# Patient Record
Sex: MALE | Race: WHITE | Employment: FULL TIME | URBAN - METROPOLITAN AREA
[De-identification: names, ages, dates, MRNs, and addresses within clinical notes are randomized per-mention and may not be internally consistent; named-entity substitution may affect disease eponyms.]

---

## 2017-03-28 ENCOUNTER — HOSPITAL ENCOUNTER (OUTPATIENT)
Dept: CARDIOLOGY | Age: 57
Discharge: HOME | End: 2017-03-28
Payer: COMMERCIAL

## 2017-03-28 PROCEDURE — 93299 HC INTERROG REMOTE LOOP RECORDER: CPT

## 2017-06-28 ENCOUNTER — HOSPITAL ENCOUNTER (OUTPATIENT)
Dept: CARDIOLOGY | Age: 57
Discharge: HOME | End: 2017-06-28
Payer: COMMERCIAL

## 2017-06-28 PROCEDURE — 93299 HC INTERROG REMOTE LOOP RECORDER: CPT

## 2017-09-29 ENCOUNTER — HOSPITAL ENCOUNTER (OUTPATIENT)
Dept: CARDIOLOGY | Age: 57
Discharge: HOME | End: 2017-09-29
Payer: COMMERCIAL

## 2017-09-29 PROCEDURE — 93299 HC INTERROG REMOTE LOOP RECORDER: CPT

## 2018-01-10 ENCOUNTER — HOSPITAL ENCOUNTER (OUTPATIENT)
Dept: CARDIOLOGY | Age: 58
Discharge: HOME | End: 2018-01-10
Payer: COMMERCIAL

## 2018-01-10 PROCEDURE — 93299 HC INTERROG REMOTE LOOP RECORDER: CPT

## 2018-04-12 ENCOUNTER — HOSPITAL ENCOUNTER (OUTPATIENT)
Dept: CARDIOLOGY | Age: 58
Discharge: HOME | End: 2018-04-12
Payer: COMMERCIAL

## 2018-04-12 PROCEDURE — 93299 HC INTERROG REMOTE LOOP RECORDER: CPT

## 2018-07-12 ENCOUNTER — HOSPITAL ENCOUNTER (OUTPATIENT)
Dept: CARDIOLOGY | Age: 58
Discharge: HOME | End: 2018-07-12
Payer: COMMERCIAL

## 2018-07-12 PROCEDURE — 93299 HC INTERROG REMOTE LOOP RECORDER: CPT

## 2018-10-15 ENCOUNTER — HOSPITAL ENCOUNTER (OUTPATIENT)
Dept: CARDIOLOGY | Age: 58
Discharge: HOME | End: 2018-10-15
Payer: COMMERCIAL

## 2018-10-15 PROCEDURE — 93299 HC INTERROG REMOTE LOOP RECORDER: CPT

## 2019-01-14 ENCOUNTER — HOSPITAL ENCOUNTER (OUTPATIENT)
Dept: CARDIOLOGY | Age: 59
Discharge: HOME | End: 2019-01-14
Payer: COMMERCIAL

## 2019-01-14 PROCEDURE — 93299 HC INTERROG REMOTE LOOP RECORDER: CPT

## 2019-04-12 ENCOUNTER — HOSPITAL ENCOUNTER (OUTPATIENT)
Dept: CARDIOLOGY | Age: 59
Discharge: HOME | End: 2019-04-12
Payer: COMMERCIAL

## 2019-04-12 PROCEDURE — 93299 HC INTERROG REMOTE LOOP RECORDER: CPT

## 2019-07-15 ENCOUNTER — HOSPITAL ENCOUNTER (OUTPATIENT)
Dept: CARDIOLOGY | Age: 59
Discharge: HOME | End: 2019-07-15
Payer: COMMERCIAL

## 2019-07-15 PROCEDURE — 93296 REM INTERROG EVL PM/IDS: CPT

## 2019-10-10 ENCOUNTER — HOSPITAL ENCOUNTER (OUTPATIENT)
Dept: CARDIOLOGY | Age: 59
Discharge: HOME | End: 2019-10-10
Payer: COMMERCIAL

## 2019-10-10 PROCEDURE — 93299 HC INTERROG REMOTE LOOP RECORDER: CPT

## 2020-01-10 ENCOUNTER — HOSPITAL ENCOUNTER (OUTPATIENT)
Dept: CARDIOLOGY | Age: 60
Discharge: HOME | End: 2020-01-10
Payer: COMMERCIAL

## 2020-01-10 PROCEDURE — G2066 INTER DEVC REMOTE 30D: HCPCS

## 2020-04-07 ENCOUNTER — HOSPITAL ENCOUNTER (OUTPATIENT)
Dept: CARDIOLOGY | Age: 60
Discharge: HOME | End: 2020-04-07
Payer: COMMERCIAL

## 2020-04-07 PROCEDURE — G2066 INTER DEVC REMOTE 30D: HCPCS

## 2020-07-09 ENCOUNTER — HOSPITAL ENCOUNTER (OUTPATIENT)
Dept: CARDIOLOGY | Age: 60
Discharge: HOME | End: 2020-07-09
Payer: COMMERCIAL

## 2020-07-09 PROCEDURE — G2066 INTER DEVC REMOTE 30D: HCPCS

## 2021-11-20 ENCOUNTER — APPOINTMENT (OUTPATIENT)
Dept: GENERAL RADIOLOGY | Age: 61
End: 2021-11-20
Payer: COMMERCIAL

## 2021-11-20 ENCOUNTER — APPOINTMENT (OUTPATIENT)
Dept: CT IMAGING | Age: 61
End: 2021-11-20
Payer: COMMERCIAL

## 2021-11-20 ENCOUNTER — HOSPITAL ENCOUNTER (EMERGENCY)
Age: 61
Discharge: HOME | End: 2021-11-20
Attending: EMERGENCY MEDICINE
Payer: COMMERCIAL

## 2021-11-20 VITALS
SYSTOLIC BLOOD PRESSURE: 162 MMHG | TEMPERATURE: 98.3 F | HEART RATE: 84 BPM | BODY MASS INDEX: 23.62 KG/M2 | RESPIRATION RATE: 20 BRPM | OXYGEN SATURATION: 97 % | HEIGHT: 70 IN | DIASTOLIC BLOOD PRESSURE: 85 MMHG | WEIGHT: 165 LBS
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
## 2021-11-20 DIAGNOSIS — S09.90XA CLOSED HEAD INJURY, INITIAL ENCOUNTER: Primary | ICD-10-CM

## 2021-11-20 PROCEDURE — 70450 CT HEAD/BRAIN W/O DYE: CPT

## 2021-11-20 PROCEDURE — 73502 X-RAY EXAM HIP UNI 2-3 VIEWS: CPT

## 2021-11-20 PROCEDURE — 99285 EMERGENCY DEPT VISIT HI MDM: CPT

## 2021-11-20 PROCEDURE — 6370000000 HC RX 637 (ALT 250 FOR IP): Performed by: EMERGENCY MEDICINE

## 2021-11-20 RX ORDER — OXYCODONE HYDROCHLORIDE AND ACETAMINOPHEN 5; 325 MG/1; MG/1
1 TABLET ORAL ONCE
Status: COMPLETED | OUTPATIENT
Start: 2021-11-20 | End: 2021-11-20

## 2021-11-20 RX ADMIN — OXYCODONE AND ACETAMINOPHEN 1 TABLET: 5; 325 TABLET ORAL at 19:42

## 2021-11-20 ASSESSMENT — PAIN DESCRIPTION - PAIN TYPE: TYPE: ACUTE PAIN

## 2021-11-20 ASSESSMENT — PAIN DESCRIPTION - ORIENTATION: ORIENTATION: RIGHT

## 2021-11-20 ASSESSMENT — PAIN DESCRIPTION - LOCATION: LOCATION: SHOULDER;HIP

## 2021-11-20 ASSESSMENT — PAIN SCALES - GENERAL
PAINLEVEL_OUTOF10: 10
PAINLEVEL_OUTOF10: 10

## 2021-11-21 NOTE — ED TRIAGE NOTES
Patient presents to the er with complaints of fall from 10-12 foot ladder  States that he lost balance and fell on his back   Pain in his right shoulder and right hip  Can not bare weight on his right hip   Denies LOC   States he had trouble breathing after falling  Denies blood thinners   Vital signs stable  Resp even and unlabored

## 2021-11-21 NOTE — ED PROVIDER NOTES
Used   Substance and Sexual Activity    Alcohol use: Not Currently    Drug use: Never    Sexual activity: None   Other Topics Concern    None   Social History Narrative    None     Social Determinants of Health     Financial Resource Strain:     Difficulty of Paying Living Expenses: Not on file   Food Insecurity:     Worried About Running Out of Food in the Last Year: Not on file    Radha of Food in the Last Year: Not on file   Transportation Needs:     Lack of Transportation (Medical): Not on file    Lack of Transportation (Non-Medical):  Not on file   Physical Activity:     Days of Exercise per Week: Not on file    Minutes of Exercise per Session: Not on file   Stress:     Feeling of Stress : Not on file   Social Connections:     Frequency of Communication with Friends and Family: Not on file    Frequency of Social Gatherings with Friends and Family: Not on file    Attends Worship Services: Not on file    Active Member of [address] [place] [address] [employer] or Organizations: Not on file    Attends Club or Organization Meetings: Not on file    Marital Status: Not on file   Intimate Partner Violence:     Fear of Current or Ex-Partner: Not on file    Emotionally Abused: Not on file    Physically Abused: Not on file    Sexually Abused: Not on file   Housing Stability:     Unable to Pay for Housing in the Last Year: Not on file    Number of [place] in the Last Year: Not on file    Unstable Housing in the Last Year: Not on file       SCREENINGS    Evelyn Coma Scale  Eye Opening: Spontaneous  Best Verbal Response: Oriented  Best Motor Response: Obeys commands  [place] Coma Scale Score: 15          PHYSICAL EXAM    (up to 7 for level 4, 8 or more for level 5)     ED Triage Vitals   BP Temp Temp Source Pulse Resp SpO2 Height Weight   11/20/21 1918 11/20/21 1918 11/20/21 1918 11/20/21 1918 11/20/21 1918 11/20/21 1918 11/20/21 1918 11/20/21 1918   (!) 162/85 98.3 °F (36.8 °C) Oral 84 20 97 % 5' 10\" (1.778 m) 165 lb (74.8 kg)       Physical Exam  Vitals and nursing note reviewed. Constitutional:       General: He is not in acute distress. Appearance: Normal appearance. He is well-developed. He is not ill-appearing. HENT:      Head: Normocephalic and atraumatic. Mouth/Throat:      Mouth: Mucous membranes are moist.      Pharynx: Oropharynx is clear. Eyes:      Extraocular Movements: Extraocular movements intact. Conjunctiva/sclera: Conjunctivae normal.   Cardiovascular:      Rate and Rhythm: Normal rate and regular rhythm. Pulmonary:      Effort: Pulmonary effort is normal.      Breath sounds: Normal breath sounds. Abdominal:      General: Bowel sounds are normal.      Palpations: Abdomen is soft. Tenderness: There is no abdominal tenderness. Musculoskeletal:         General: No deformity. Normal range of motion. Cervical back: Normal range of motion and neck supple. Comments: 5/5 lower extremity strength b/l   Skin:     General: Skin is warm and dry. Capillary Refill: Capillary refill takes less than 2 seconds. Neurological:      General: No focal deficit present. Mental Status: He is alert and oriented to person, place, and time. Mental status is at baseline. Cranial Nerves: No cranial nerve deficit. Psychiatric:         Thought Content: Thought content normal.         DIAGNOSTIC RESULTS     EKG: All EKG's are interpreted by the Emergency Department Physician who either signs or Co-signs this chart in the absence of a cardiologist.    RADIOLOGY:   Non-plain film images such as CT, Ultrasound and MRI are read by the radiologist. Plain radiographic images are visualized and preliminarily interpreted by the emergency physician with the below findings:    Interpretation per the Radiologist below, if available at the time of this note:    CT Head WO Contrast   Final Result     Chronic changes as described. No acute intracranial hemorrhage or    space-occupying lesion.

## 2025-02-11 NOTE — PROGRESS NOTES
"    History: Primitivo \"Esteban\" is here for his neck. He continues to have pain in the shoulder blades and up into the neck.  We saw him last year for similar issue but he did not want to go to formal PT.  He has not tried any formal therapy and is currently on Celebrex. He denies numbness and tingling down the arm.     Past medical history: Multiple  Medications: Multiple  Allergies: No known drug allergies    Please refer to the intake H&P regarding the patient's review of systems, family history and social history as was done today    HEENT: Normal  Lungs: Clear to auscultation  Heart: RRR  Abdomen: Soft, nontender  Skin: clear  Extremity: He has audible crepitus with neck extension and pain during rotation.  Pain is more central but also radiates into the bilateral medial scapular borders.  No pain past the shoulders.  No numbness or tingling.  5 out of 5 strength in all groups tested.  Contralateral exam is normal for strength, motion, stability and neurovascular assessment.    Radiographs: Previous AP, lateral, flexion and extension views of the cervical spine show complete loss of lordosis as well as multilevel degenerative disk disease.     Assessment: Cervical DDD with radiculopathy    Plan: We again discussed several options for treatment regarding his neck pain. We will get him in to some formal therapy on cervical DDD protocol, traction, and modalities. He can continue his Celebrex as directed. We will also give him a referral to the spine team to establish care for his neck complaints if not improving.    All questions were answered today with the patient.    Sophiee Attestation  By signing my name below, IHaily Scribe   attest that this documentation has been prepared under the direction and in the presence of Nino Gil MD.    "

## 2025-02-12 ENCOUNTER — APPOINTMENT (OUTPATIENT)
Dept: ORTHOPEDIC SURGERY | Facility: CLINIC | Age: 65
End: 2025-02-12
Payer: MEDICARE

## 2025-02-12 DIAGNOSIS — M50.30 DEGENERATIVE DISC DISEASE, CERVICAL: ICD-10-CM

## 2025-02-12 PROCEDURE — 99213 OFFICE O/P EST LOW 20 MIN: CPT | Performed by: ORTHOPAEDIC SURGERY

## 2025-03-10 ENCOUNTER — EVALUATION (OUTPATIENT)
Dept: PHYSICAL THERAPY | Facility: HOSPITAL | Age: 65
End: 2025-03-10
Payer: MEDICARE

## 2025-03-10 DIAGNOSIS — M50.30 DEGENERATIVE DISC DISEASE, CERVICAL: ICD-10-CM

## 2025-03-10 PROCEDURE — 97161 PT EVAL LOW COMPLEX 20 MIN: CPT | Mod: GP | Performed by: PHYSICAL THERAPIST

## 2025-03-10 PROCEDURE — 97110 THERAPEUTIC EXERCISES: CPT | Mod: GP | Performed by: PHYSICAL THERAPIST

## 2025-03-10 PROCEDURE — 97012 MECHANICAL TRACTION THERAPY: CPT | Mod: GP | Performed by: PHYSICAL THERAPIST

## 2025-03-10 ASSESSMENT — PAIN SCALES - GENERAL: PAINLEVEL_OUTOF10: 5 - MODERATE PAIN

## 2025-03-10 ASSESSMENT — PAIN - FUNCTIONAL ASSESSMENT: PAIN_FUNCTIONAL_ASSESSMENT: 0-10

## 2025-03-10 NOTE — PROGRESS NOTES
"Physical Therapy    Physical Therapy Evaluation and Treatment      Patient Name: Primitivo Olguin \"Esteban\"  MRN: [id number]  Today's Date: 3/10/2025    Time Entry:   Time Calculation  Start Time: 1015  Stop Time: 1055  Time Calculation (min): 40 min  PT Evaluation Time Entry  PT Evaluation (Low) Time Entry: 20  PT Therapeutic Procedures Time Entry  Therapeutic Exercise Time Entry: 10  PT Modalities Time Entry  Mechanical Traction Time Entry: 10                Assessment:  This 65 yo pleasant male is present today with the diagnosis of cervical DDD.  He reports chronic neck and (B) shoulder pain.  No paresthesias into his (B) UE.  No shoulder pain, just neck pain this morning.  His cervical AROM WFL's, hypomobile and pain and crepitus noted with his AROM.  Moderate to severe forward head alignment.  Mild tenderness to his cervical paraspinals and upper trapezius musculature.  He reports pain with driving, reading and during his instrumental ADL's and work activities around the house.  He reports getting some headaches at times.  Unloaded extension force to his mid and lower cervical spine centralizer of his symptoms.  He became a little dizzy when doing to retractions secondary to tightness in her upper cervical musculature.  Instructed him to not push back as forceful into the towel roll.  Cervical traction felt good to his neck as well.  All  questions answered and pt given HEP.        Plan:  Continue with skilled PT as needed      Current Problem:   1. Degenerative disc disease, cervical  Referral to Physical Therapy    Follow Up In Physical Therapy          Subjective  Pt reports chronic neck pain and some shoulder pain      Pain:  Pain Assessment  Pain Assessment: 0-10  0-10 (Numeric) Pain Score: 5 - Moderate pain  Pain Type: Chronic pain  Pain Location: Neck    Objective     AROM:  Cervical AROM WFL's noting some hypomobility in his cervical spine.  Crepitus with AROM      Strength:  No myotomal weakness.  (B) UE 5/5 " grossly throughout      Posture:  Moderate to severe forward head alignment      Palpation:  Mild tenderness to cervical paraspinals and upper trapezius musculature      Special Tests:  Negative Spurling's    Outcome Measures:  Other Measures  Neck Disability Index: 26%     Treatments:    Unloaded cervical retractions with towel roll under his head, 30 reps *    Mechanical traction, 20 lbs, 5 minutes x 2      Goals:    1.  Abolish all cervical symptoms  2.  Maintain proper cervical alignment in sitting  3.  Cervical AROM WFL's and pain free  4.  No tenderness to cervical and scapular musculature  5.  Return to 100% overall function

## 2025-03-11 ENCOUNTER — TELEPHONE (OUTPATIENT)
Dept: PHYSICAL THERAPY | Facility: HOSPITAL | Age: 65
End: 2025-03-11
Payer: MEDICARE

## 2025-03-11 NOTE — TELEPHONE ENCOUNTER
PT FOLLOW-UP AUTH RCVD. PC TO PT TO SCHED; LMOM TO RTN CALL TO SCHED APPROVED VISITS    [facility] MEDICARE: PT 8V 03/10/25 - 05/05/25 (C-SPINE)  M50.30- Degenerative disc disease, cervical

## 2025-04-18 ENCOUNTER — TELEPHONE (OUTPATIENT)
Dept: PHYSICAL THERAPY | Facility: HOSPITAL | Age: 65
End: 2025-04-18
Payer: MEDICARE

## 2025-04-18 NOTE — TELEPHONE ENCOUNTER
2ND ATTEMPT TO CONTACT TO SCHED FOR PT AUTH ON  FILE STILL VALID. LMOM TO CALL TO SCHED IF STILL INTERESTED    [employer] MEDICARE       BOA                   COPAY 0   8  PT  VISITS  3-10-25 THRU 5-5-25    AUTH# [id number]     [id number]    1-2x/week for 8 PT treatments